# Patient Record
Sex: FEMALE | Race: OTHER | URBAN - METROPOLITAN AREA
[De-identification: names, ages, dates, MRNs, and addresses within clinical notes are randomized per-mention and may not be internally consistent; named-entity substitution may affect disease eponyms.]

---

## 2018-10-23 NOTE — PATIENT DISCUSSION
New Prescription: neomycin-polymyxin-dexameth (neomycin-polymyxin-dexameth): ointment: 3.5-10,000-0.1 mg-unit/g-% a small amount three times a day into left eye 10-

## 2018-10-23 NOTE — PATIENT DISCUSSION
New Prescription: trimethoprim-polymyxin B (trimethoprim-polymyxin b): drops: 0.1-10,000%-unit/mL every three hours into left eye

## 2018-10-23 NOTE — PATIENT DISCUSSION
CONJUNCTIVITIS (VIRAL), OS__:  PRESCRIBE MAXITROL TID OD FOR 1 WEEK THEN STOP_DID SAY OKAY TO WEAR CLS FRIDAY NIGHT FOR A FEW HOURS____________________. RETURN FOR FOLLOW-UP AS SCHEDULED.

## 2019-07-29 NOTE — PATIENT DISCUSSION
Stopped Today: neomycin-polymyxin-dexameth (neomycin-polymyxin-dexameth): drops,suspension: 3.5-10,000-0.1 mg/mL-unit/mL-% 1 drop three times a day into left eye 10-

## 2019-07-29 NOTE — PATIENT DISCUSSION
Stopped Today: trimethoprim-polymyxin B (trimethoprim-polymyxin b): drops: 0.1-10,000%-unit/mL every three hours into left eye

## 2019-07-29 NOTE — PATIENT DISCUSSION
CONJUNCTIVITIS (ALLERGIC), OU: RECOMMEND OTC ZADITOR BID OU AS NEEDED FOR ITCHING AND ALLERGY SYMPTOMS. RETURN FOR FOLLOW-UP AS SCHEDULED.

## 2021-07-12 NOTE — PATIENT DISCUSSION
Stopped Today: Lotemax SM (loteprednol etabonate): drops,gel: 0.38% 1 drop twice a day into both eyes 06-

## 2021-07-12 NOTE — PATIENT DISCUSSION
New Prescription: Lotemax SM (loteprednol etabonate): drops,gel: 0.38% 1 drop three times a day into both eyes 07-

## 2021-07-19 NOTE — PATIENT DISCUSSION
Continue: Lotemax SM (loteprednol etabonate): drops,gel: 0.38% 1 drop three times a day into both eyes 07-

## 2021-07-19 NOTE — PATIENT DISCUSSION
DRY EYE WITH INFLAMMATION:  PRESERVATIVE FREE ARTIFICIAL TEARS Q2 HOURS, LID HYGIENE. PRESCRIBED LOTEMAX BID THROUGH FRIDAY THEN DISCONTINUE. CONSIDER RESTASIS  OR PUNCTAL PLUGS AND/OR LIPIFLOW AT FOLLOW. RETURN SOONER IF SYMPTOMS WORSEN.

## 2022-07-05 RX ORDER — ESCITALOPRAM OXALATE 10 MG/1
TABLET ORAL
COMMUNITY
End: 2022-10-28

## 2022-07-05 RX ORDER — ARIPIPRAZOLE 2 MG/1
TABLET ORAL
COMMUNITY
End: 2022-10-28

## 2022-07-05 RX ORDER — MONTELUKAST SODIUM 10 MG/1
TABLET ORAL
COMMUNITY

## 2022-08-09 ENCOUNTER — NEW PATIENT (OUTPATIENT)
Dept: URBAN - METROPOLITAN AREA CLINIC 9 | Facility: CLINIC | Age: 22
End: 2022-08-09

## 2022-08-09 DIAGNOSIS — H04.123: ICD-10-CM

## 2022-08-09 DIAGNOSIS — H10.45: ICD-10-CM

## 2022-08-09 DIAGNOSIS — H52.03: ICD-10-CM

## 2022-08-09 PROCEDURE — 92015 DETERMINE REFRACTIVE STATE: CPT

## 2022-08-09 PROCEDURE — 92004 COMPRE OPH EXAM NEW PT 1/>: CPT

## 2022-08-09 ASSESSMENT — KERATOMETRY
OS_AXISANGLE2_DEGREES: 81
OS_AXISANGLE_DEGREES: 171
OD_K2POWER_DIOPTERS: 46.25
OD_AXISANGLE_DEGREES: 012
OD_AXISANGLE2_DEGREES: 102
OD_K1POWER_DIOPTERS: 45.50
OS_K2POWER_DIOPTERS: 46.50
OS_K1POWER_DIOPTERS: 45.75

## 2022-08-09 ASSESSMENT — TONOMETRY
OD_IOP_MMHG: 18
OS_IOP_MMHG: 18

## 2022-08-09 ASSESSMENT — VISUAL ACUITY
OU_SC: 20/25-2
OU_SC: J1+
OS_SC: J1+
OD_SC: J1+
OD_SC: 20/30+1
OS_SC: 20/25-1